# Patient Record
(demographics unavailable — no encounter records)

---

## 2025-05-23 NOTE — HEALTH RISK ASSESSMENT
[Excellent] : ~his/her~  mood as  excellent [No] : No [Little interest or pleasure doing things] : 1) Little interest or pleasure doing things [Feeling down, depressed, or hopeless] : 2) Feeling down, depressed, or hopeless [0] : 2) Feeling down, depressed, or hopeless: Not at all (0) [PHQ-2 Negative - No further assessment needed] : PHQ-2 Negative - No further assessment needed [Audit-CScore] : 0 [[id number]] : 0 [HIV test declined] : HIV test declined [Hepatitis C test declined] : Hepatitis C test declined [Reports changes in hearing] : Reports no changes in hearing [Reports changes in vision] : Reports no changes in vision [Smoke Detector] : smoke detector [Seat Belt] :  uses seat belt [Patient/Caregiver not ready to engage] : , patient/caregiver not ready to engage [AdvancecareDate] : 5/25 [Never] : Never [NO] : No

## 2025-05-23 NOTE — HISTORY OF PRESENT ILLNESS
[FreeTextEntry1] : establish care annual wellness exam [de-identified] : Ms. MAYRA IBARRA is a 28 year female Pt. have no known medical history. Pt. is overall feeling well. No change in weight. No change in bowel and bladder habits. No trouble sleeping at night.

## 2025-05-23 NOTE — HEALTH RISK ASSESSMENT
[Excellent] : ~his/her~  mood as  excellent [No] : No [Little interest or pleasure doing things] : 1) Little interest or pleasure doing things [Feeling down, depressed, or hopeless] : 2) Feeling down, depressed, or hopeless [0] : 2) Feeling down, depressed, or hopeless: Not at all (0) [PHQ-2 Negative - No further assessment needed] : PHQ-2 Negative - No further assessment needed [Audit-CScore] : 0 [[id number]] : 0 [HIV test declined] : HIV test declined [Hepatitis C test declined] : Hepatitis C test declined [Reports changes in hearing] : Reports no changes in hearing [Reports changes in vision] : Reports no changes in vision [Smoke Detector] : smoke detector [Seat Belt] :  uses seat belt [Patient/Caregiver not ready to engage] : , patient/caregiver not ready to engage [AdvancecareDate] : 5/25 [Never] : Never [NO] : No

## 2025-05-23 NOTE — ASSESSMENT
[Vaccines Reviewed] : Immunizations reviewed today. Please see immunization details in the vaccine log within the immunization flowsheet.  [FreeTextEntry1] :  Annual: Ordered comprehensive blood work HIV and Hep C  screening test. deferred labs drawn in the office  The results will be reviewed, and any abnormalities will be addressed accordingly.  well women  exam to see with gyn. Skin CA screening: Avoid excess sun exposure. To use sunscreen.   flu vaccine:deferred tdap: deferred   alcohol screening :SIBRT:0 Depression screening:PHQ2:0   Sleep apnea screening: document scanned to the chart. risk:low

## 2025-05-23 NOTE — HISTORY OF PRESENT ILLNESS
[FreeTextEntry1] : establish care annual wellness exam [de-identified] : Ms. MAYRA IBARRA is a 28 year female Pt. have no known medical history. Pt. is overall feeling well. No change in weight. No change in bowel and bladder habits. No trouble sleeping at night.